# Patient Record
Sex: FEMALE | Race: WHITE | NOT HISPANIC OR LATINO | Employment: OTHER | URBAN - METROPOLITAN AREA
[De-identification: names, ages, dates, MRNs, and addresses within clinical notes are randomized per-mention and may not be internally consistent; named-entity substitution may affect disease eponyms.]

---

## 2023-04-26 NOTE — PROGRESS NOTES
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 Doris attended this phone visit alone.    I met with her to discuss personal and family medical history, review possible genetic contributions to her family's history of symptoms, and to obtain informed consent for genetic testing, if indicated. History is obtained from Doris herself and the medical record.     I was originally slated to meet with Doris's father, Abdiel Krause, to discuss the possibility of genetic involvement in his development of cardiomyopathy. Unfortunately, Mr. Krause passed before we were able to meet.     This afternoon, I met with Doris to discuss the possibility of genetic testing for family members. We reviewed the preferred testing method (testing affected family members first) and she noted that she had collected a saliva sample for her father prior to his passing (with his consent). I have asked her to forward me information about the test and performing laboratory before we proceed with possible additional testing. Per Dr. Krause, those results should be ready in approximately 3 months.     Mr. Krause has a history of coronary artery disease with bypass surgery at 55. He reportedly had no additional cardiac concerns until age 84. In 2019, he had a stroke and aFib, from which he fully recovered. In 2020, however, his wife (Doris's mother) noted that his energy was flagging and during a family visit Doris and her siblings noted edema of the legs. At that point, the family convinced Mr. Krause to go to the ER for evaluation. He was admitted to the hospital for approximately one  week on that occasion.     Upon discharge, he and his wife moved into the home of Doris's brother. He was up and walking (quarter mile to mailbox and back, etc) until Fall of 2020, when his mobility and energy significantly decreased again. By November, Doris reports, he was unable to even get across the hospital parking lot on his own. He maintained for an additional ~2-3 years before being admitted for COVID infection in December of 2022.     He was then evaluated by a cardiologist, who suggested genetic evaluation due to Mr. Krause's history of cardiac concerns and echocardiographic findings. Additionally, carpal tunnel and pain were taken into consideration as well. A diagnosis of cardiac amyloidosis was given at that time, though a confirmatory biopsy was not done. He was referred to genetics at that time.     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     Accompanying urinary retention and chronic urinary concerns managed by urologist    We reviewed the features of TTR amyloidosis and Doris relayed her experience (or lack thereof) with each feature.     Neuropathy: none reported  Sensitivity to pain & temperature: none reported  Movement, walking, or balance concerns: Doris notes that her balance is not as good as it used to be, though this may be attributed to age.  Fainting/dizziness on standing: Some reported throughout life, including when standing in a choir. Possibly attributable to dehydration/singing/etc.  Changes in GI motility: Listed above.  Genitourinary changes: Listed above.  Carpal tunnel: None.  Cardiac concerns: Listed above  Ocular/vision concerns:  None.     Social History  Doris lives  with her spouse and two daughters. 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    Father available for testing: No, recently  - testing is already pending per Doris  Mother available for testing: Yes  Full sibling available for testing: Yes   Half sibling available for testing: NA    Previous Genetic Testing  None reported.     Family History  A standard three generation pedigree was obtained and is scanned into the medical record.        Children: Doris has two daughters, ages 16 and 14. They were conceived using donor eggs from Doris's younger sister (now age 52). Her 16 year old has congenital hypothyroidism, severe eczema, and ADHD with anxiety. Her 14 year old has a condition of bone overgrowth in the hands and feet that has required reconstructive surgery. She has not been tested for genetic causes but has been evaluated by genetics, who declined to test.     Siblings:     Full siblings: 4 siblings, five children total.     Doris has an older brother, age 63, with a history of seizure at 40 (no recurrence), possible aFib (though original workup negative) and history of smoking.     Doris has a younger brother, age 59, who is reportedly healthy but may have had infertility concerns earlier in life.     Her 58 year old sister has a history of a heart murmur diagnosed during her days as a ; she also has glaucoma and during pregnancy may have had carpal tunnel syndrome.     That sister has five daughters; the eldest is 33 and the youngest is 21. That niece has a history of autoimmune hepatitis and ulcerative colitis.     Doris has a 52 year old younger sister with a history of a serious lung infection that abscessed and required resection. Infection was not cultured to determine etiology.     She has two sons (26, 23) and a daughter (20). The 23 year old nephew of Doris has a history of sarcoma (possibly leiomyoma) behind  "lilia diagnosed at 14. He is now doing well.       Paternal:    Father: Abdiel, recently  at age 88. History is noted above.     Paternal grandfather:  in his 60s in a state hospital due to unknown causes. He had a history of behavioral concerns; Doris reports that he had been in a house fire when he was younger and \"was never the same\". His mental and behavioral health had declined in the intervening years.     Paternal grandmother: Lived to her early 80s and  due to ALS.    Paternal relatives: Abdiel had six younger sisters. One  at a few weeks of age, attributed to an infection. The other siblings all have type II diabetes. One sister in her early 80s has dementia. One sister in her mid 70s has been experiencing dizziness, seizures, a non-cancerous brain tumor, and is being worked up for ALS.       Maternal:    Mother:  Living at 89. History of primary biliary cirrhosis that is well-managed. She had breast cancer at age 72. Doris notes her mother had had a cyst removed and chest radiation while growing up.     Maternal grandfather: No information given.    Maternal grandmother: No information given.    Maternal relatives: No information given.    There are no additional reports of family members with neuropathy, multiple myeloma, cardiomyopathy, glaucoma, or other pertinent history. Paternal ancestry is of [place] and Mennonite (likely Turkish or [place]ian)  descent.  Maternal ancestry is of [place] descent.      Background Information  Every cell in our body contains a complete set of the instructions that our body needs to function.  These instructions come in the form of our DNA, or long, double-stranded chains of chemical compounds. Portions of DNA that code for a specific product or have a known function are called genes.       Since there's so much information that goes into human functioning and since every tiny cell needs a complete set, our DNA is tightly wound into compact " "structures called chromosomes. Most people have 46 chromosomes, with 23 coming from each parent. The 23rd pair are sometimes referred to as the \"sex chromosomes\", as they typically determine biological sex development (XX and XY). Sometimes, changes to chromosomes (like deleted pieces, duplicated pieces, or entire extra chromosomes) can cause genetic instructions to no longer work. When this occurs, a genetic disorder is possible. This type of change is called a copy number variant, because a person would not have the expected number (two) of copies of a gene.     Genetic disorders can also be caused by a single change in a single gene, like a typo in a word. These may be called point mutations, missense variants, or nonsense variants; the name usually depends on the effect the change has on the body's instructions. The genetic disorders caused by this type of change are often called single gene disorders.     Genetic changes can come from either parent or be brand new in a person. When a change is brand new in an individual, it's called a de brenna change. For some conditions, both copies of a gene (both the one from mother and the one from father) need to be altered to show a trait or disease. This is called autosomal recessive inheritance. Other conditions just require one copy of a gene to be changed in order to show a trait or disease. This is called autosomal dominant inheritance.     What is hATTR?  Hereditary ATTR amyloidosis is a condition that causes a buildup in the body of a misfolded protein, resulting in slowly progressive peripheral neuropathy. This condition is due to a genetic change (see Genetic Testing subsection) and typically onsets after age 60, though it can appear earlier in life (30s) or later (70s). hATTR exhibits variable expressivity (meaning that the same genetic change can look different in different people, even between family members) and variable penetrance (meaning not everybody with " "a genetic change will develop the condition. Age of onset and severity of disease progression may vary with ancestral background, age, sex, and even between different mutations in the same gene.      hATTR can cause symptoms throughout the body. These include:     Peripheral neuropathy: burning, tingling, pain, numbness, weakness of the hands, feet, lower legs    Carpal tunnel syndrome and loss of fine motor control in the hands    Autonomic dysfunction, causing issues with blood pressure, heart rate, genitourinary function, sweating, and digestion    Cardiomyopathy, or thickening of the heart tissue, due to buildup of protein deposits    Vitreous opacities or \"floaters\" of the eye    Kidney damage      Treatment for hATTR is focused on managing symptoms and retaining quality of life. While there is no cure, there are medications to treat polyneuropathy in hATTR in adults. We discussed the median lifespan following diagnosis of hATTR is around five years.      Genetic Testing  As mentioned above, genetic disorders can be caused by a single change in a single gene; you can think of this like a typo in a word. In the case of hATTR, a single change in the gene TTR is sufficient to cause development of the condition. For this reason, we describe hATTR as a \"single gene disorder\". hATTR exhibits autosomal dominant inheritance; therefore, it doesn't \"skip generations\". Individuals who are NOT found to have a certain genetic change cannot pass that genetic change on to their descendents.      To assess hATTR, we typically begin with a biopsy to assess for amyloid deposits. We then progress to sequence analysis of the TTR gene. This gene is responsible for production of transthyretin protein, and genetic alterations may cause a misfolding of protein from expected structure. This aberrant protein is not appropriately broken down and builds up in the tissues of the body, causing symptoms. Doris Krause expressed an " excellent understanding of this information and agreed to the plan detailed below (see Plan).      Management and surveillance of Doris and her siblings could depend on the genetic test results. As mentioned previously and of particular note, medication exists to treat polyneuropathy caused by hATTR in adults. Therefore, a positive genetic test result could open the door to new treatment options for her. Positive genetic testing could also inform Doris Krause's children of their own risk for developing hATTR. We discussed that, due to the dominant inheritance pattern of hATTR, relatives of an affected individual would have a 50% chance of also having this genetic change. Importantly, if a relative does not have the genetic change, they would not be able to pass the condition to their own children.     In light of this information, Doris Krause's symptoms, and relevant family history it is my assessment that genetic testing is both clinically indicated and medically actionable.      We discussed the details, limitations, and possible outcomes of next generation sequencing for hATTR.  There are three types of results:  1. Negative: meaning no pathogenic variants were identified in the genes that were tested  ? A negative result does not rule out the possibility that Doris's symptoms are due to a genetic etiology.  2. Positive: meaning one (or more) pathogenic variants were identified in the genes that were tested that are associated with Doris's symptoms  ? We discussed that a positive result could provide an etiology to Doris's symptoms, give anticipatory guidance as to their potential progression, and clarify risks to family members.  We also discussed that a positive result could indicate that Doris is at risks for other health concerns, outside of their presenting symptoms  3. Uncertain: meaning one (or more) variants were identified in the genes that were tested, but there is not enough data to know if  this variant is disease-causing; these are called variants of uncertain significance (VUS)  ? In most cases, identification of a VUS does not confirm a diagnosis and does not result in any clinically actionable recommendations.  The variant will be monitored over time to see if more information is known about it in the future.  If a VUS is identified, testing of other relatives may be helpful to provide clarification.    Next Steps & Plan  At this time, Doris MCKENZIE Nelida elects to proceed with genetic testing. However, testing will be most informative if we wait to review her father's test results first when they are released.    1. I will email Dr. Krause to provide my contact information and to follow up on two questions from our discussion.  2. Dr. Krause will provide me a copy of her father's result report when it is ready.   3. If her father tested positive for hATTR, testing for Doris and her siblings would be warranted.    It was an absolute delight meeting with Dr. Doris Krause today. She vocalized understanding of the information we discussed and her questions and concerns were addressed. She has been provided with my contact information should any questions arise regarding our visit or plan moving forward. In total, I spent 56 minutes in telephone counseling with this patient.     Dee Biggs MS, [facility]  Genetic Counselor - [facility]  Phone: [phone]  Email: [email or web address]

## 2023-04-27 ENCOUNTER — VIRTUAL VISIT (OUTPATIENT)
Dept: CONSULT | Facility: CLINIC | Age: 63
End: 2023-04-27
Attending: GENETIC COUNSELOR, MS
Payer: COMMERCIAL

## 2023-04-27 VITALS — BODY MASS INDEX: 26.52 KG/M2 | WEIGHT: 165 LBS | HEIGHT: 66 IN

## 2023-04-27 DIAGNOSIS — Z84.81 FAMILY HISTORY OF CARRIER OF GENETIC DISEASE: ICD-10-CM

## 2023-04-27 PROCEDURE — 96040 HC GENETIC COUNSELING, EACH 30 MINUTES: CPT | Mod: TEL,95

## 2023-04-27 RX ORDER — ROSUVASTATIN CALCIUM 20 MG/1
20 TABLET, COATED ORAL
COMMUNITY
Start: 2021-11-15

## 2023-04-27 RX ORDER — PROPRANOLOL HCL 60 MG
60 CAPSULE, EXTENDED RELEASE 24HR ORAL AT BEDTIME
COMMUNITY
Start: 2023-04-14

## 2023-04-27 RX ORDER — RIMEGEPANT SULFATE 75 MG/75MG
TABLET, ORALLY DISINTEGRATING ORAL
COMMUNITY
Start: 2021-11-08

## 2023-04-27 RX ORDER — TIZANIDINE 2 MG/1
TABLET ORAL
COMMUNITY
Start: 2023-04-19

## 2023-04-27 NOTE — LETTER
2023      RE: Doris Krause  [address] [facility] [address]     Dear Colleague,    Thank you for the opportunity to participate in the care of your patient, Doris Krause, at the [facility] at [facility]. Please see a copy of my visit note below.    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 Doris attended this phone visit alone.    I met with her to discuss personal and family medical history, review possible genetic contributions to her family's history of symptoms, and to obtain informed consent for genetic testing, if indicated. History is obtained from Doris herself and the medical record.     I was originally slated to meet with Doris's father, Abdiel Krause, to discuss the possibility of genetic involvement in his development of cardiomyopathy. Unfortunately, Mr. Krause passed before we were able to meet.     This afternoon, I met with Doris to discuss the possibility of genetic testing for family members. We reviewed the preferred testing method (testing affected family members first) and she noted that she had collected a saliva sample for her father prior to his passing (with his consent). I have asked her to forward me information about the test and performing laboratory before we proceed with possible additional testing. Per Dr. Krause, those results should be ready in approximately 3 months.     Mr. Krause has a history of coronary artery disease with bypass surgery at 55. He reportedly had no additional cardiac concerns until age  84. In 2019, he had a stroke and aFib, from which he fully recovered. In March of 2020, however, his wife (Doris's mother) noted that his energy was flagging and during a family visit Doris and her siblings noted edema of the legs. At that point, the family convinced Mr. Krause to go to the ER for evaluation. He was admitted to the hospital for approximately one week on that occasion.     Upon discharge, he and his wife moved into the home of Doris's brother. He was up and walking (quarter mile to mailbox and back, etc) until Fall of 2020, when his mobility and energy significantly decreased again. By November, Doris reports, he was unable to even get across the hospital parking lot on his own. He maintained for an additional ~2-3 years before being admitted for COVID infection in December of 2022.     He was then evaluated by a cardiologist, who suggested genetic evaluation due to Mr. Krause's history of cardiac concerns and echocardiographic findings. Additionally, carpal tunnel and pain were taken into consideration as well. A diagnosis of cardiac amyloidosis was given at that time, though a confirmatory biopsy was not done. He was referred to genetics at that time.     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   Accompanying urinary retention and chronic urinary concerns managed by urologist    We reviewed the features of TTR amyloidosis and Doris relayed her experience (or lack thereof) with each feature.     Neuropathy: none reported  Sensitivity to pain & temperature: none reported  Movement, walking, or balance concerns: Doris notes that her balance is not as good as it used to be, though this may be  attributed to age.  Fainting/dizziness on standing: Some reported throughout life, including when standing in a choir. Possibly attributable to dehydration/singing/etc.  Changes in GI motility: Listed above.  Genitourinary changes: Listed above.  Carpal tunnel: None.  Cardiac concerns: Listed above  Ocular/vision concerns:  None.     Social History  Doris lives with her spouse and two daughters. 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    Father available for testing: No, recently  - testing is already pending per Doris  Mother available for testing: Yes  Full sibling available for testing: Yes   Half sibling available for testing: NA    Previous Genetic Testing  None reported.     Family History  A standard three generation pedigree was obtained and is scanned into the medical record.      Children: Doris has two daughters, ages 16 and 14. They were conceived using donor eggs from Doris's younger sister (now age 52). Her 16 year old has congenital hypothyroidism, severe eczema, and ADHD with anxiety. Her 14 year old has a condition of bone overgrowth in the hands and feet that has required reconstructive surgery. She has not been tested for genetic causes but has been evaluated by genetics, who declined to test.   Siblings:   Full siblings: 4 siblings, five children total.   Doris has an older brother, age 63, with a history of seizure at 40 (no recurrence), possible aFib (though original workup negative) and history of smoking.   Doris has a younger brother, age 59, who is reportedly healthy but may have had infertility concerns earlier in life.   Her 58 year old sister has a history of a heart murmur diagnosed during her days as a ; she also has glaucoma and during pregnancy may have had carpal tunnel syndrome.   That sister has five daughters; the eldest is 33 and the youngest is 21. That niece has a history of  "autoimmune hepatitis and ulcerative colitis.   Doris has a 52 year old younger sister with a history of a serious lung infection that abscessed and required resection. Infection was not cultured to determine etiology.   She has two sons (26, 23) and a daughter (20). The 23 year old nephew of Doris has a history of sarcoma (possibly leiomyoma) behind marbone diagnosed at 14. He is now doing well.     Paternal:  Father: Abdiel, recently  at age 88. History is noted above.   Paternal grandfather:  in his 60s in a [facility] hospital due to unknown causes. He had a history of behavioral concerns; Doris reports that he had been in a house fire when he was younger and \"was never the same\". His mental and behavioral health had declined in the intervening years.   Paternal grandmother: Lived to her early 80s and  due to ALS.  Paternal relatives: Abdiel had six younger sisters. One  at a few weeks of age, attributed to an infection. The other siblings all have type II diabetes. One sister in her early 80s has dementia. One sister in her mid 70s has been experiencing dizziness, seizures, a non-cancerous brain tumor, and is being worked up for ALS.     Maternal:  Mother:  Living at 89. History of primary biliary cirrhosis that is well-managed. She had breast cancer at age 72. Doris notes her mother had had a cyst removed and chest radiation while growing up.   Maternal grandfather: No information given.  Maternal grandmother: No information given.  Maternal relatives: No information given.    There are no additional reports of family members with neuropathy, multiple myeloma, cardiomyopathy, glaucoma, or other pertinent history. Paternal ancestry is of Mongolian and Mennonite (likely [place] or [place]ese)  descent.  Maternal ancestry is of [place]ian descent.      Background Information  Every cell in our body contains a complete set of the instructions that our body needs to function.  These instructions come in " "the form of our DNA, or long, double-stranded chains of chemical compounds. Portions of DNA that code for a specific product or have a known function are called genes.       Since there's so much information that goes into human functioning and since every tiny cell needs a complete set, our DNA is tightly wound into compact structures called chromosomes. Most people have 46 chromosomes, with 23 coming from each parent. The 23rd pair are sometimes referred to as the \"sex chromosomes\", as they typically determine biological sex development (XX and XY). Sometimes, changes to chromosomes (like deleted pieces, duplicated pieces, or entire extra chromosomes) can cause genetic instructions to no longer work. When this occurs, a genetic disorder is possible. This type of change is called a copy number variant, because a person would not have the expected number (two) of copies of a gene.     Genetic disorders can also be caused by a single change in a single gene, like a typo in a word. These may be called point mutations, missense variants, or nonsense variants; the name usually depends on the effect the change has on the body's instructions. The genetic disorders caused by this type of change are often called single gene disorders.     Genetic changes can come from either parent or be brand new in a person. When a change is brand new in an individual, it's called a de brenna change. For some conditions, both copies of a gene (both the one from mother and the one from father) need to be altered to show a trait or disease. This is called autosomal recessive inheritance. Other conditions just require one copy of a gene to be changed in order to show a trait or disease. This is called autosomal dominant inheritance.     What is hATTR?  Hereditary ATTR amyloidosis is a condition that causes a buildup in the body of a misfolded protein, resulting in slowly progressive peripheral neuropathy. This condition is due to a genetic " "change (see Genetic Testing subsection) and typically onsets after age 60, though it can appear earlier in life (30s) or later (70s). hATTR exhibits variable expressivity (meaning that the same genetic change can look different in different people, even between family members) and variable penetrance (meaning not everybody with a genetic change will develop the condition. Age of onset and severity of disease progression may vary with ancestral background, age, sex, and even between different mutations in the same gene.      hATTR can cause symptoms throughout the body. These include:   Peripheral neuropathy: burning, tingling, pain, numbness, weakness of the hands, feet, lower legs  Carpal tunnel syndrome and loss of fine motor control in the hands  Autonomic dysfunction, causing issues with blood pressure, heart rate, genitourinary function, sweating, and digestion  Cardiomyopathy, or thickening of the heart tissue, due to buildup of protein deposits  Vitreous opacities or \"floaters\" of the eye  Kidney damage      Treatment for hATTR is focused on managing symptoms and retaining quality of life. While there is no cure, there are medications to treat polyneuropathy in hATTR in adults. We discussed the median lifespan following diagnosis of hATTR is around five years.      Genetic Testing  As mentioned above, genetic disorders can be caused by a single change in a single gene; you can think of this like a typo in a word. In the case of hATTR, a single change in the gene TTR is sufficient to cause development of the condition. For this reason, we describe hATTR as a \"single gene disorder\". hATTR exhibits autosomal dominant inheritance; therefore, it doesn't \"skip generations\". Individuals who are NOT found to have a certain genetic change cannot pass that genetic change on to their descendents.      To assess hATTR, we typically begin with a biopsy to assess for amyloid deposits. We then progress to sequence analysis " of the TTR gene. This gene is responsible for production of transthyretin protein, and genetic alterations may cause a misfolding of protein from expected structure. This aberrant protein is not appropriately broken down and builds up in the tissues of the body, causing symptoms. Doris Krause expressed an excellent understanding of this information and agreed to the plan detailed below (see Plan).      Management and surveillance of Doris and her siblings could depend on the genetic test results. As mentioned previously and of particular note, medication exists to treat polyneuropathy caused by hATTR in adults. Therefore, a positive genetic test result could open the door to new treatment options for her. Positive genetic testing could also inform Doris Krause's children of their own risk for developing hATTR. We discussed that, due to the dominant inheritance pattern of hATTR, relatives of an affected individual would have a 50% chance of also having this genetic change. Importantly, if a relative does not have the genetic change, they would not be able to pass the condition to their own children.     In light of this information, Doris Krause's symptoms, and relevant family history it is my assessment that genetic testing is both clinically indicated and medically actionable.      We discussed the details, limitations, and possible outcomes of next generation sequencing for hATTR.  There are three types of results:  Negative: meaning no pathogenic variants were identified in the genes that were tested  A negative result does not rule out the possibility that Doris's symptoms are due to a genetic etiology.  Positive: meaning one (or more) pathogenic variants were identified in the genes that were tested that are associated with Doris's symptoms  We discussed that a positive result could provide an etiology to Doris's symptoms, give anticipatory guidance as to their potential progression, and clarify  risks to family members.  We also discussed that a positive result could indicate that Doris is at risks for other health concerns, outside of their presenting symptoms  Uncertain: meaning one (or more) variants were identified in the genes that were tested, but there is not enough data to know if this variant is disease-causing; these are called variants of uncertain significance (VUS)  In most cases, identification of a VUS does not confirm a diagnosis and does not result in any clinically actionable recommendations.  The variant will be monitored over time to see if more information is known about it in the future.  If a VUS is identified, testing of other relatives may be helpful to provide clarification.    Next Steps & Plan  At this time, Doris Krause elects to proceed with genetic testing. However, testing will be most informative if we wait to review her father's test results first when they are released.    I will email Dr. Krause to provide my contact information and to follow up on two questions from our discussion.  Dr. Krause will provide me a copy of her father's result report when it is ready.   If her father tested positive for hATTR, testing for Doris and her siblings would be warranted.    It was an absolute delight meeting with Dr. Doris Krause today. She vocalized understanding of the information we discussed and her questions and concerns were addressed. She has been provided with my contact information should any questions arise regarding our visit or plan moving forward. In total, I spent 56 minutes in telephone counseling with this patient.     Dee Biggs MS, [facility]  Genetic Counselor - [facility]  Phone: [phone]  Email: [email or web address]

## 2023-04-27 NOTE — NURSING NOTE
Patient reports that she also takes biotin 10,000 mg a day and vitamin D 2000 international unit(s) daily. Patient notes she takes tizanidine twice a day.    Is the patient currently in the state of [place]? YES    Visit mode:TELEPHONE    If the visit is dropped, the patient can be reconnected by: TELEPHONE VISIT: Phone number: [phone]    Will anyone else be joining the visit? NO      How would you like to obtain your AVS? Mail a copy    Are changes needed to the allergy or medication list? YES: Patient reports that she also takes biotin 10,000 mg a day and vitamin D 2000 international unit(s) daily. Paitent notes she takes tizanidine twice a day.    Reason for visit: Telephone

## 2023-05-16 ENCOUNTER — TELEPHONE (OUTPATIENT)
Dept: CONSULT | Facility: CLINIC | Age: 63
End: 2023-05-16
Payer: COMMERCIAL

## 2023-05-16 NOTE — TELEPHONE ENCOUNTER
Doris reached out to let me know her father's results from whole genome sequencing were available. His test was conducted through a company called [employer]. CAP and CLIA certification information appears to be unavailable at this time. I sent the following response to her via email -  ----------------  Dr. Nelida Kwon - thanks so much for forwarding this information along. It looks like we currently have the raw data, but not the reports themselves yet. I want to hold off on making any suggestions until I ve had a chance to review their laboratory reports to assess their testing and variant classification methodologies. Unfortunately, I m not a bioinformatics expert, so there isn t much I m able to do with raw genomic data from an outside lab.     Typically, we are a little wary of utilizing direct-to-consumer genetic testing companies given that some of them are not appropriately validated and many are not considered consistent enough to assist clinical decision-making. Given the circumstances of your father passing before we were able to do clinical grade testing (and given your own medical background), I feel at least somewhat comfortable steering the ship based on these results. Once I ve had a chance to review the formal reports, we can assess whether there is anything we recommend for further evaluation for you and your family members.     Please let me know if you have any questions and again, thank you for allowing me to part of your family s care.     ----------------------------    Dee Biggs MS, [facility]  Genetic Counselor - [facility]  Phone: [phone]  Email: [email or web address]    Dee

## 2023-06-14 ENCOUNTER — TELEPHONE (OUTPATIENT)
Dept: CONSULT | Facility: CLINIC | Age: 63
End: 2023-06-14
Payer: COMMERCIAL

## 2023-06-14 NOTE — TELEPHONE ENCOUNTER
Followed up after review of Doris's father's result report:   --------------------------------------  Dr. Nelida Khalil -    I appreciate you following up! I had a chance to spend some time on this this morning now that the reports are ready. 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 This genetic change is classified as a likely benign alteration (with some sources classifying it as fully benign). To clarify, any changes identified on genetic tests are classified on a spectrum of pathogenicity (benign to likely benign to uncertain significance to likely pathogenic to pathogenic). Variant classification is a dynamic science; we utilize population frequency, in silico predictions (via computer modeling) on effect on the protein product of the gene, and other criteria to characterize the likelihood that a genetic change is harmful (or, conversely, whether it s just normal variation between unique humans). The data that we currently have about this change indicates that it does not cause protein damage causative of hereditary transthyretin amyloidosis.     You can see more of the variant classification information for this change here: https://[email or web address].Allclasses/clinical-db/variant/snp/[email or web address]    One piece of supporting information is that this is an exceptionally common variant. Around 2.5% of the global population has this change, and, if your family is of  descent, that percentage goes up to 2.7% of individuals. If this genetic change was harmful, we would expect many more individuals worldwide to be affected with TTR.  Variant allele frequency data can be viewed at the Single Nucleotide Polymorphism Database curated by the NIH: https://www.ncbi.nlm.nih.gov/snp/mm51281064    At this time, a genetic counseling visit is not necessary as this is not suggestive of hereditary transthyretin amyloidosis; I do not recommend testing you or other family members for this condition. Any  neurological symptoms should be reported to your care team, of course, but it is doubtful they are related to this genetic change. It s important to note that we do not change medical management for anything VUS or lower on the pathogenicity scale. Additionally, patient-initiated genetic testing is typically not considered clinical-grade testing and most (if not all) care providers will not alter your care based on this report.     Please let me know if you have any questions or concerns about this information. It has been a pleasure working with you and I wish you and your family well!    Dee  --------------------------------------

## 2023-10-22 ENCOUNTER — HEALTH MAINTENANCE LETTER (OUTPATIENT)
Age: 63
End: 2023-10-22

## 2023-12-31 ENCOUNTER — HEALTH MAINTENANCE LETTER (OUTPATIENT)
Age: 63
End: 2023-12-31

## 2025-01-19 ENCOUNTER — HEALTH MAINTENANCE LETTER (OUTPATIENT)
Age: 65
End: 2025-01-19